# Patient Record
Sex: MALE | Race: WHITE | URBAN - METROPOLITAN AREA
[De-identification: names, ages, dates, MRNs, and addresses within clinical notes are randomized per-mention and may not be internally consistent; named-entity substitution may affect disease eponyms.]

---

## 2024-10-26 ENCOUNTER — OFFICE VISIT (OUTPATIENT)
Age: 34
End: 2024-10-26

## 2024-10-26 VITALS
TEMPERATURE: 98.2 F | WEIGHT: 305 LBS | SYSTOLIC BLOOD PRESSURE: 142 MMHG | OXYGEN SATURATION: 96 % | HEIGHT: 77 IN | RESPIRATION RATE: 18 BRPM | HEART RATE: 82 BPM | DIASTOLIC BLOOD PRESSURE: 78 MMHG | BODY MASS INDEX: 36.01 KG/M2

## 2024-10-26 DIAGNOSIS — L23.9 ALLERGIC DERMATITIS: ICD-10-CM

## 2024-10-26 DIAGNOSIS — L23.7 POISON IVY DERMATITIS: Primary | ICD-10-CM

## 2024-10-26 RX ORDER — DEXTROAMPHETAMINE SACCHARATE, AMPHETAMINE ASPARTATE MONOHYDRATE, DEXTROAMPHETAMINE SULFATE AND AMPHETAMINE SULFATE 3.75; 3.75; 3.75; 3.75 MG/1; MG/1; MG/1; MG/1
15 CAPSULE, EXTENDED RELEASE ORAL DAILY
COMMUNITY
Start: 2024-10-24 | End: 2025-10-24

## 2024-10-26 RX ORDER — TRIAMCINOLONE ACETONIDE 1 MG/G
CREAM TOPICAL
Qty: 28 G | Refills: 0 | Status: SHIPPED | OUTPATIENT
Start: 2024-10-26

## 2024-10-26 RX ORDER — DEXAMETHASONE SODIUM PHOSPHATE 10 MG/ML
10 INJECTION, SOLUTION INTRAMUSCULAR; INTRAVENOUS ONCE
Status: COMPLETED | OUTPATIENT
Start: 2024-10-26 | End: 2024-10-26

## 2024-10-26 RX ORDER — PREDNISONE 20 MG/1
20 TABLET ORAL 2 TIMES DAILY
Qty: 10 TABLET | Refills: 0 | Status: SHIPPED | OUTPATIENT
Start: 2024-10-27 | End: 2024-11-01

## 2024-10-26 RX ORDER — CETIRIZINE HYDROCHLORIDE 10 MG/1
10 TABLET ORAL DAILY
Qty: 30 TABLET | Refills: 0 | Status: SHIPPED | OUTPATIENT
Start: 2024-10-26 | End: 2024-11-25

## 2024-10-26 RX ADMIN — DEXAMETHASONE SODIUM PHOSPHATE 10 MG: 10 INJECTION, SOLUTION INTRAMUSCULAR; INTRAVENOUS at 10:17

## 2024-10-26 ASSESSMENT — ENCOUNTER SYMPTOMS
COUGH: 0
VOMITING: 0
WHEEZING: 0
COLOR CHANGE: 1
NAUSEA: 0
CHEST TIGHTNESS: 0
SHORTNESS OF BREATH: 0
EYES NEGATIVE: 1
DIARRHEA: 0

## 2024-10-26 NOTE — PATIENT INSTRUCTIONS
Take medications as prescribed  Avoid contact with poison ivy plant  Clean area with rash, pad dry before applying cream  Avoid scratching if possible  Keep nails short to avoid abrasion on skin  Antihistamine may cause drowsiness, do not take and operate machinery  Return to clinic or go to ER if symptoms worsen  Follow up with your primary care provider

## 2024-10-26 NOTE — PROGRESS NOTES
[facility], [facility] ([place])  [facility]  [address] [address]  Dept: [phone]  Dept Fax: [phone]  Loc: [phone]  Oscar Liang is a 34 y.o. male who presents today for his medical conditions/complaintsas noted below.  Oscar Liang is c/o of Rash (Pt c/o of possible poison ivy x 5 days)      HPI:       History provided by:  Patient  Rash  This is a new problem. The current episode started in the past 7 days. The problem has been gradually worsening since onset. The rash is diffuse. The rash is characterized by itchiness, swelling and redness. He was exposed to plant contact. Pertinent negatives include no cough, diarrhea, facial edema, fatigue, fever, shortness of breath or vomiting. Past treatments include anti-itch cream. The treatment provided no relief.       History reviewed. No pertinent past medical history.   History reviewed. No pertinent surgical history.    History reviewed. No pertinent family history.    Social History     Tobacco Use    Smoking status: Never    Smokeless tobacco: Never   Substance Use Topics    Alcohol use: Not on file      Current Outpatient Medications   Medication Sig Dispense Refill    amphetamine-dextroamphetamine (ADDERALL XR) 15 MG extended release capsule Take 1 capsule by mouth daily.      [START ON 10/27/2024] predniSONE (DELTASONE) 20 MG tablet Take 1 tablet by mouth 2 times daily for 5 days 10 tablet 0    triamcinolone (KENALOG) 0.1 % cream Apply topically 2 times daily to areas with rash until heal 28 g 0    cetirizine (ZYRTEC) 10 MG tablet Take 1 tablet by mouth daily In the morning 30 tablet 0     No current facility-administered medications for this visit.     Allergies   Allergen Reactions    Latex     Peanut-Containing Drug Products     Penicillins Hives       There are no preventive care reminders to display for this patient.    Subjective:     Review of Systems   Constitutional:  Negative for activity